# Patient Record
Sex: FEMALE | Race: BLACK OR AFRICAN AMERICAN | Employment: FULL TIME | URBAN - METROPOLITAN AREA
[De-identification: names, ages, dates, MRNs, and addresses within clinical notes are randomized per-mention and may not be internally consistent; named-entity substitution may affect disease eponyms.]

---

## 2019-09-04 ENCOUNTER — OFFICE VISIT (OUTPATIENT)
Dept: URGENT CARE | Age: 29
End: 2019-09-04

## 2019-09-04 VITALS
HEART RATE: 105 BPM | RESPIRATION RATE: 18 BRPM | SYSTOLIC BLOOD PRESSURE: 135 MMHG | OXYGEN SATURATION: 100 % | DIASTOLIC BLOOD PRESSURE: 74 MMHG | BODY MASS INDEX: 41.82 KG/M2 | WEIGHT: 213 LBS | TEMPERATURE: 98.6 F | HEIGHT: 60 IN

## 2019-09-04 DIAGNOSIS — M79.605 PAIN OF LEFT LOWER EXTREMITY: Primary | ICD-10-CM

## 2019-09-04 PROBLEM — E66.01 OBESITY, MORBID (HCC): Status: ACTIVE | Noted: 2019-09-04

## 2019-09-04 RX ORDER — OMEPRAZOLE 20 MG/1
20 TABLET, DELAYED RELEASE ORAL DAILY
COMMUNITY

## 2019-09-04 RX ORDER — NAPROXEN 500 MG/1
500 TABLET ORAL 2 TIMES DAILY WITH MEALS
Qty: 20 TAB | Refills: 0 | Status: SHIPPED | OUTPATIENT
Start: 2019-09-04

## 2019-09-04 RX ORDER — BACLOFEN 20 MG/1
20 TABLET ORAL 2 TIMES DAILY
Qty: 15 TAB | Refills: 0 | Status: SHIPPED | OUTPATIENT
Start: 2019-09-04

## 2019-09-04 NOTE — PATIENT INSTRUCTIONS
Calf Strain: Rehab Exercises  Introduction  Here are some examples of exercises for you to try. The exercises may be suggested for a condition or for rehabilitation. Start each exercise slowly. Ease off the exercises if you start to have pain. You will be told when to start these exercises and which ones will work best for you. How to do the exercises  Calf wall stretch (back knee straight)    1. Stand facing a wall with your hands on the wall at about eye level. Put your affected leg about a step behind your other leg. 2. Keeping your back leg straight and your back heel on the floor, bend your front knee and gently bring your hip and chest toward the wall until you feel a stretch in the calf of your back leg. 3. Hold the stretch for at least 15 to 30 seconds. 4. Repeat 2 to 4 times. Calf wall stretch (knees bent)    1. Stand facing a wall with your hands on the wall at about eye level. Put your affected leg about a step behind your other leg. 2. Keeping both heels on the floor, bend both knees. Then gently bring your hip and chest toward the wall until you feel a stretch in the calf of your back leg. 3. Hold the stretch for at least 15 to 30 seconds. 4. Repeat 2 to 4 times. Bilateral calf stretch (knees straight)    1. Place a book on the floor a few inches from a wall or countertop, and put the balls of your feet on it. Your heels should be on the floor. The book needs to be thick enough so that you can feel a gentle stretch in each calf. If you are not steady on your feet, hold on to a chair, counter, or wall while you do this stretch. 2. Keep your knees straight, and lean forward until you feel a stretch in each calf. 3. To get more stretch, add another book or use a thicker book, such as a phone book, a dictionary, or an encyclopedia. 4. Hold the stretch for at least 15 to 30 seconds. 5. Repeat 2 to 4 times. Bilateral calf stretch (knees bent)    1.  Place a book on the floor a few inches from a wall or countertop, and put the balls of your feet on it. Your heels should be on the floor. The book needs to be thick enough so that you can feel a gentle stretch in each calf. If you are not steady on your feet, hold on to a chair, counter, or wall while you do this stretch. 2. Bend your knees, and lean forward until you feel a stretch in each calf. 3. To get more stretch, add another book or use a thicker book, such as a phone book, a dictionary, or an encyclopedia. 4. Hold the stretch for at least 15 to 30 seconds. 5. Repeat 2 to 4 times. Ankle plantarflexion    1. Sit with your affected leg straight and supported on the floor. Your other leg should be bent, with that foot flat on the floor. 2. Keeping your affected leg straight, gently flex your foot downward so your toes are pointed away from your body. Then slowly relax your foot to the starting position. 3. Repeat 8 to 12 times. Ankle dorsiflexion    1. Sit with your affected leg straight and supported on the floor. Your other leg should be bent, with that foot flat on the floor. 2. Keeping your leg straight, gently flex your foot back so your toes point upward. Then slowly relax your foot to the starting position. 3. Repeat 8 to 12 times. Bilateral heel raises on step    1. Stand on the bottom step of a staircase, facing up toward the stairs. Put the balls of your feet on the step. If you are not steady on your feet, hold on to the banister or wall. 2. Keeping both knees straight, slowly lift your heels above the step so that you are standing on your toes. Then slowly lower your heels below the step and toward the floor. 3. Return to the starting position, with your feet even with the step. 4. Repeat 8 to 12 times. Follow-up care is a key part of your treatment and safety. Be sure to make and go to all appointments, and call your doctor if you are having problems.  It's also a good idea to know your test results and keep a list of the medicines you take. Where can you learn more? Go to http://yoli-irvin.info/. Enter X113 in the search box to learn more about \"Calf Strain: Rehab Exercises. \"  Current as of: September 20, 2018  Content Version: 12.1  © 8438-9196 [employer]. Care instructions adapted under license by [employer] (which disclaims liability or warranty for this information). If you have questions about a medical condition or this instruction, always ask your healthcare professional. [address] any warranty or liability for your use of this information.

## 2019-09-04 NOTE — PROGRESS NOTES
Leg Pain   This is a new problem. The current episode started more than 1 week ago. The problem occurs daily. The problem has not changed since onset. 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 The symptoms are aggravated by standing and walking. The symptoms are relieved by rest. She has tried acetaminophen for the symptoms. The treatment provided mild relief. Past Medical History:   Diagnosis Date    Acid reflux         History reviewed. No pertinent surgical history.       Family History   Problem Relation Age of Onset    No Known Problems Mother     No Known Problems Father         Social History     Socioeconomic History    Marital status: UNKNOWN     Spouse name: Not on file    Number of children: Not on file    Years of education: Not on file    Highest education level: Not on file   Occupational History    Not on file   Social Needs    Financial resource strain: Not on file    Food insecurity:     Worry: Not on file     Inability: Not on file    Transportation needs:     Medical: Not on file     Non-medical: Not on file   Tobacco Use    Smoking status: Never Smoker    Smokeless tobacco: Never Used   Substance and Sexual Activity    Alcohol use: Not on file    Drug use: Not on file    Sexual activity: Not on file   Lifestyle    Physical activity:     Days per week: Not on file     Minutes per session: Not on file    Stress: Not on file   Relationships    Social connections:     Talks on phone: Not on file     Gets together: Not on file     Attends Bahai service: Not on file     Active member of club or organization: Not on file     Attends meetings of clubs or organizations: Not on file     Relationship status: Not on file    Intimate partner violence:     Fear of current or ex partner: Not on file     Emotionally abused: Not on file     Physically abused: Not on file     Forced sexual activity: Not on file   Other Topics Concern    Not on file   Social History Narrative    Not on file                ALLERGIES: Patient has no known allergies. Review of Systems   All other systems reviewed and are negative. Vitals:    09/04/19 1606   BP: 135/74   Pulse: (!) 105   Resp: 18   Temp: 98.6 °F (37 °C)   SpO2: 100%   Weight: 213 lb (96.6 kg)   Height: 5' (1.524 m)       Physical Exam   Musculoskeletal:        Left hip: Normal.        Left knee: Normal.        Left ankle: Normal.        Left upper leg: Normal.        Left lower leg: Normal. She exhibits no tenderness, no swelling and no edema. Left foot: There is deformity (flat foot). There is normal range of motion, no tenderness and no swelling. Nursing note and vitals reviewed. MDM    Procedures        ICD-10-CM ICD-9-CM    1. Pain of left lower extremity M79.605 729.5     calf strain- ? poor posture/flat foot related      Self exercise  If pain worsen/ develop swelling go to Ed  Advised to [employer] foot in warm water- self exercise  Use arch support and proper fitting new shoes  Medications Ordered Today   Medications    baclofen (LIORESAL) 20 mg tablet     Sig: Take 1 Tab by mouth two (2) times a day. Dispense:  15 Tab     Refill:  0    naproxen (NAPROSYN) 500 mg tablet     Sig: Take 1 Tab by mouth two (2) times daily (with meals). Dispense:  20 Tab     Refill:  0     No results found for any visits on 09/04/19. The patients condition was discussed with the patient and they understand. The patient is to follow up with primary care doctor. If signs and symptoms become worse the pt is to go to the ER. The patient is to take medications as prescribed.

## 2020-11-13 ENCOUNTER — APPOINTMENT (OUTPATIENT)
Dept: CT IMAGING | Age: 30
End: 2020-11-13
Attending: NURSE PRACTITIONER
Payer: COMMERCIAL

## 2020-11-13 ENCOUNTER — APPOINTMENT (OUTPATIENT)
Dept: ULTRASOUND IMAGING | Age: 30
End: 2020-11-13
Attending: NURSE PRACTITIONER
Payer: COMMERCIAL

## 2020-11-13 ENCOUNTER — HOSPITAL ENCOUNTER (EMERGENCY)
Age: 30
Discharge: HOME | End: 2020-11-14
Attending: EMERGENCY MEDICINE | Admitting: EMERGENCY MEDICINE
Payer: COMMERCIAL

## 2020-11-13 DIAGNOSIS — R79.89 ELEVATED LFTS: ICD-10-CM

## 2020-11-13 DIAGNOSIS — R22.0 FACIAL SWELLING: ICD-10-CM

## 2020-11-13 DIAGNOSIS — R59.0 LYMPHADENOPATHY, SUBMANDIBULAR: Primary | ICD-10-CM

## 2020-11-13 LAB
ALBUMIN SERPL-MCNC: 3.2 G/DL (ref 3.5–5)
ALBUMIN/GLOB SERPL: 0.9 {RATIO} (ref 1.1–2.2)
ALP SERPL-CCNC: 718 U/L (ref 45–117)
ALT SERPL-CCNC: 236 U/L (ref 12–78)
ANION GAP SERPL CALC-SCNC: 9 MMOL/L (ref 5–15)
AST SERPL-CCNC: 142 U/L (ref 15–37)
BASOPHILS # BLD: 0 K/UL (ref 0–0.1)
BASOPHILS NFR BLD: 0 % (ref 0–1)
BILIRUB SERPL-MCNC: 1.5 MG/DL (ref 0.2–1)
BUN SERPL-MCNC: 15 MG/DL (ref 6–20)
BUN/CREAT SERPL: 12 (ref 12–20)
CALCIUM SERPL-MCNC: 9.3 MG/DL (ref 8.5–10.1)
CHLORIDE SERPL-SCNC: 102 MMOL/L (ref 97–108)
CO2 SERPL-SCNC: 27 MMOL/L (ref 21–32)
CREAT SERPL-MCNC: 1.25 MG/DL (ref 0.55–1.02)
DIFFERENTIAL METHOD BLD: ABNORMAL
EOSINOPHIL # BLD: 0 K/UL (ref 0–0.4)
EOSINOPHIL NFR BLD: 0 % (ref 0–7)
ERYTHROCYTE [DISTWIDTH] IN BLOOD BY AUTOMATED COUNT: 20.1 % (ref 11.5–14.5)
GLOBULIN SER CALC-MCNC: 3.6 G/DL (ref 2–4)
GLUCOSE SERPL-MCNC: 95 MG/DL (ref 65–100)
HCG UR QL: NEGATIVE
HCT VFR BLD AUTO: 37.4 % (ref 35–47)
HETEROPH AB BLD QL IA: NEGATIVE
HGB BLD-MCNC: 11.6 G/DL (ref 11.5–16)
IMM GRANULOCYTES # BLD AUTO: 0 K/UL
IMM GRANULOCYTES NFR BLD AUTO: 0 %
LIPASE SERPL-CCNC: 31 U/L (ref 73–393)
LYMPHOCYTES # BLD: 8 K/UL (ref 0.8–3.5)
LYMPHOCYTES NFR BLD: 51 % (ref 12–49)
MCH RBC QN AUTO: 22.8 PG (ref 26–34)
MCHC RBC AUTO-ENTMCNC: 31 G/DL (ref 30–36.5)
MCV RBC AUTO: 73.5 FL (ref 80–99)
MONOCYTES # BLD: 0.5 K/UL (ref 0–1)
MONOCYTES NFR BLD: 3 % (ref 5–13)
NEUTS SEG # BLD: 7.3 K/UL (ref 1.8–8)
NEUTS SEG NFR BLD: 46 % (ref 32–75)
NRBC # BLD: 0 K/UL (ref 0–0.01)
NRBC BLD-RTO: 0 PER 100 WBC
PLATELET # BLD AUTO: 560 K/UL (ref 150–400)
PMV BLD AUTO: 9.9 FL (ref 8.9–12.9)
POTASSIUM SERPL-SCNC: 3.3 MMOL/L (ref 3.5–5.1)
PROT SERPL-MCNC: 6.8 G/DL (ref 6.4–8.2)
RBC # BLD AUTO: 5.09 M/UL (ref 3.8–5.2)
RBC MORPH BLD: ABNORMAL
SODIUM SERPL-SCNC: 138 MMOL/L (ref 136–145)
WBC # BLD AUTO: 15.8 K/UL (ref 3.6–11)
WBC MORPH BLD: ABNORMAL

## 2020-11-13 PROCEDURE — 74011000636 HC RX REV CODE- 636: Performed by: RADIOLOGY

## 2020-11-13 PROCEDURE — 87040 BLOOD CULTURE FOR BACTERIA: CPT

## 2020-11-13 PROCEDURE — 70491 CT SOFT TISSUE NECK W/DYE: CPT

## 2020-11-13 PROCEDURE — 74011000258 HC RX REV CODE- 258: Performed by: NURSE PRACTITIONER

## 2020-11-13 PROCEDURE — 83690 ASSAY OF LIPASE: CPT

## 2020-11-13 PROCEDURE — 74011000258 HC RX REV CODE- 258: Performed by: RADIOLOGY

## 2020-11-13 PROCEDURE — 74011250636 HC RX REV CODE- 250/636: Performed by: NURSE PRACTITIONER

## 2020-11-13 PROCEDURE — 87254 VIRUS INOCULATION SHELL VIA: CPT

## 2020-11-13 PROCEDURE — 81025 URINE PREGNANCY TEST: CPT

## 2020-11-13 PROCEDURE — 80053 COMPREHEN METABOLIC PANEL: CPT

## 2020-11-13 PROCEDURE — 76705 ECHO EXAM OF ABDOMEN: CPT

## 2020-11-13 PROCEDURE — 86308 HETEROPHILE ANTIBODY SCREEN: CPT

## 2020-11-13 PROCEDURE — 85025 COMPLETE CBC W/AUTO DIFF WBC: CPT

## 2020-11-13 PROCEDURE — 96375 TX/PRO/DX INJ NEW DRUG ADDON: CPT

## 2020-11-13 PROCEDURE — 99284 EMERGENCY DEPT VISIT MOD MDM: CPT

## 2020-11-13 PROCEDURE — 36415 COLL VENOUS BLD VENIPUNCTURE: CPT

## 2020-11-13 PROCEDURE — 96366 THER/PROPH/DIAG IV INF ADDON: CPT

## 2020-11-13 PROCEDURE — 96365 THER/PROPH/DIAG IV INF INIT: CPT

## 2020-11-13 RX ORDER — DEXAMETHASONE SODIUM PHOSPHATE 10 MG/ML
10 INJECTION INTRAMUSCULAR; INTRAVENOUS ONCE
Status: COMPLETED | OUTPATIENT
Start: 2020-11-13 | End: 2020-11-13

## 2020-11-13 RX ORDER — SODIUM CHLORIDE 0.9 % (FLUSH) 0.9 %
10 SYRINGE (ML) INJECTION
Status: COMPLETED | OUTPATIENT
Start: 2020-11-13 | End: 2020-11-13

## 2020-11-13 RX ORDER — KETOROLAC TROMETHAMINE 30 MG/ML
30 INJECTION, SOLUTION INTRAMUSCULAR; INTRAVENOUS
Status: COMPLETED | OUTPATIENT
Start: 2020-11-13 | End: 2020-11-13

## 2020-11-13 RX ORDER — DIPHENHYDRAMINE HYDROCHLORIDE 50 MG/ML
25 INJECTION, SOLUTION INTRAMUSCULAR; INTRAVENOUS
Status: COMPLETED | OUTPATIENT
Start: 2020-11-13 | End: 2020-11-14

## 2020-11-13 RX ADMIN — SODIUM CHLORIDE 1000 ML: 900 INJECTION, SOLUTION INTRAVENOUS at 20:22

## 2020-11-13 RX ADMIN — SODIUM CHLORIDE 50 ML: 900 INJECTION, SOLUTION INTRAVENOUS at 22:00

## 2020-11-13 RX ADMIN — SODIUM CHLORIDE 1000 ML: 900 INJECTION, SOLUTION INTRAVENOUS at 21:26

## 2020-11-13 RX ADMIN — KETOROLAC TROMETHAMINE 30 MG: 30 INJECTION, SOLUTION INTRAMUSCULAR at 20:22

## 2020-11-13 RX ADMIN — IOPAMIDOL 100 ML: 612 INJECTION, SOLUTION INTRAVENOUS at 20:01

## 2020-11-13 RX ADMIN — Medication 10 ML: at 22:00

## 2020-11-13 RX ADMIN — AMPICILLIN SODIUM AND SULBACTAM SODIUM 3 G: 2; 1 INJECTION, POWDER, FOR SOLUTION INTRAMUSCULAR; INTRAVENOUS at 21:26

## 2020-11-13 RX ADMIN — DEXAMETHASONE SODIUM PHOSPHATE 10 MG: 10 INJECTION, SOLUTION INTRAMUSCULAR; INTRAVENOUS at 20:22

## 2020-11-13 NOTE — LETTER
[address] 
[address] Work/School Note Date: 11/13/2020 To Whom It May concern: 
 
Prabhu Kimball was seen and treated today in the emergency room by the following provider(s): 
Attending Provider: Abril Trinidad MD 
Nurse Practitioner: Celestino Morales NP. Prabhu Kimball may return to work on 11/18/20. Sincerely, Sasha Pineda NP

## 2020-11-13 NOTE — ED TRIAGE NOTES
Pt reports R sided jaw pain and swelling x4 days. Pt has seen a dentist, oral surgeon, and orthodontist today and they referred her here.

## 2020-11-14 VITALS
OXYGEN SATURATION: 98 % | BODY MASS INDEX: 36.29 KG/M2 | DIASTOLIC BLOOD PRESSURE: 88 MMHG | TEMPERATURE: 97.9 F | HEIGHT: 59 IN | SYSTOLIC BLOOD PRESSURE: 133 MMHG | RESPIRATION RATE: 16 BRPM | WEIGHT: 180 LBS | HEART RATE: 89 BPM

## 2020-11-14 PROCEDURE — 96375 TX/PRO/DX INJ NEW DRUG ADDON: CPT

## 2020-11-14 PROCEDURE — 96366 THER/PROPH/DIAG IV INF ADDON: CPT

## 2020-11-14 PROCEDURE — 74011000250 HC RX REV CODE- 250: Performed by: NURSE PRACTITIONER

## 2020-11-14 PROCEDURE — 74011250636 HC RX REV CODE- 250/636: Performed by: NURSE PRACTITIONER

## 2020-11-14 PROCEDURE — 2709999900 HC NON-CHARGEABLE SUPPLY

## 2020-11-14 RX ORDER — PREDNISONE 20 MG/1
20 TABLET ORAL DAILY
Qty: 5 TAB | Refills: 0 | Status: SHIPPED | OUTPATIENT
Start: 2020-11-15 | End: 2020-11-20

## 2020-11-14 RX ORDER — CLINDAMYCIN HYDROCHLORIDE 150 MG/1
450 CAPSULE ORAL 3 TIMES DAILY
Qty: 90 CAP | Refills: 0 | Status: SHIPPED | OUTPATIENT
Start: 2020-11-14 | End: 2020-11-24

## 2020-11-14 RX ADMIN — DIPHENHYDRAMINE HYDROCHLORIDE 25 MG: 50 INJECTION, SOLUTION INTRAMUSCULAR; INTRAVENOUS at 00:04

## 2020-11-14 RX ADMIN — FAMOTIDINE 20 MG: 10 INJECTION INTRAVENOUS at 00:04

## 2020-11-14 NOTE — DISCHARGE INSTRUCTIONS
There is no clear-cut cause as to why you have so much swelling, but we are testing you for the mumps virus. Your CT scan does show evidence of swollen lymph nodes and soft tissue swelling, which could be due to inflammatory condition or infectious condition. For for this reason, please take prednisone and clindamycin exactly as prescribed. You may also take medications like ibuprofen to help with discomfort and swelling. Mumps is extremely contagious, and it is very important that you completely isolate yourself until your test results come back. If negative, you can resume your normal activities. If it is positive, you must have a follow-up appointment with primary care doctor to screen you for continued isolation. Please make an appointment to establish care with a primary care physician as soon as possible for re-evaluation. You may have also had an allergic reaction to either the antibiotic or the IV contrast during your visit. Your symptoms improved significantly after Benadryl. Please take Benadryl (25 mg) as needed for itching.

## 2020-11-14 NOTE — ED NOTES
NAD, VSS, rx electronically sent, baseline mentation, no questions r/t dci, work note given, information r/t PCP's given

## 2020-11-14 NOTE — ED PROVIDER NOTES
This is a 82-year-old female with past medical history of acid reflux and obesity who presents the ER today for evaluation of facial swelling. According to patient, she developed unprovoked facial swelling starting on Tuesday of this week which is continued to progress in nature since she first noticed it. She reports going to several different physicians specialties, including dentist, oral surgeon, and primary care, who did not find any abnormalities in her dentition or any findings concerning for an infectious process. One of the doctors believe that she had TMJ due to jaw tenderness and trismus, and prescribed ibuprofen and Robaxin (which she has not taken). At this time, she reports minimal discomfort, but reports severe limitation with opening her mouth due to the degree of swelling. She also reports a sensation of dyspnea when she lays in a supine positioning \"because my neck is so swollen\". She denies any swelling of her lips. She also denies any tooth pain, fever/chills, rash, or skin lesions. No headache, nausea, vomiting, or visual disturbances. Has not had any changes in the characteristics of her voice. Patient states that all of her immunizations are up-to-date.              Past Medical History:   Diagnosis Date    Acid reflux        Past Surgical History:   Procedure Laterality Date    HX TONSILLECTOMY      HX WISDOM TEETH EXTRACTION           Family History:   Problem Relation Age of Onset    No Known Problems Mother     No Known Problems Father        Social History     Socioeconomic History    Marital status: UNKNOWN     Spouse name: Not on file    Number of children: Not on file    Years of education: Not on file    Highest education level: Not on file   Occupational History    Not on file   Social Needs    Financial resource strain: Not on file    Food insecurity     Worry: Not on file     Inability: Not on file    Transportation needs     Medical: Not on file Non-medical: Not on file   Tobacco Use    Smoking status: Never Smoker    Smokeless tobacco: Never Used   Substance and Sexual Activity    Alcohol use: Yes     Comment: socially    Drug use: Not Currently    Sexual activity: Not on file   Lifestyle    Physical activity     Days per week: Not on file     Minutes per session: Not on file    Stress: Not on file   Relationships    Social connections     Talks on phone: Not on file     Gets together: Not on file     Attends Church service: Not on file     Active member of club or organization: Not on file     Attends meetings of clubs or organizations: Not on file     Relationship status: Not on file    Intimate partner violence     Fear of current or ex partner: Not on file     Emotionally abused: Not on file     Physically abused: Not on file     Forced sexual activity: Not on file   Other Topics Concern    Not on file   Social History Narrative    Not on file         ALLERGIES: Patient has no known allergies. Review of Systems   Constitutional: Negative for fever. HENT: Positive for facial swelling. Negative for dental problem, drooling and sore throat. Eyes: Negative for visual disturbance. Respiratory: Negative for cough, shortness of breath, wheezing and stridor. Cardiovascular: Negative for palpitations. Gastrointestinal: Negative for vomiting. Genitourinary: Negative for dysuria. Musculoskeletal: Negative for myalgias. Skin: Negative for rash. Neurological: Negative for dizziness. Psychiatric/Behavioral: Negative for dysphoric mood. Vitals:    11/13/20 1851 11/14/20 0121   BP: (!) 144/87 133/88   Pulse: (!) 108 89   Resp: 18 16   Temp: 98.6 °F (37 °C) 97.9 °F (36.6 °C)   SpO2: 100% 98%   Weight: 81.6 kg (180 lb)    Height: 4' 11\" (1.499 m)             Physical Exam  Vitals signs and nursing note reviewed. Constitutional:       General: She is not in acute distress. Appearance: Normal appearance.  She is not ill-appearing. HENT:      Head: Normocephalic and atraumatic. Jaw: There is normal jaw occlusion. Trismus, swelling and pain on movement present. Salivary Glands: Right salivary gland is diffusely enlarged. Left salivary gland is diffusely enlarged. Comments: Significant swelling of buccal mucosa with no swelling of the lips     Right Ear: External ear normal.      Left Ear: External ear normal.      Nose: Nose normal.      Mouth/Throat:      Mouth: Mucous membranes are moist.      Pharynx: Oropharynx is clear. Eyes:      General: No scleral icterus. Extraocular Movements: Extraocular movements intact. Conjunctiva/sclera: Conjunctivae normal.      Pupils: Pupils are equal, round, and reactive to light. Neck:      Musculoskeletal: Normal range of motion and neck supple. Edema present. No injury, pain with movement or muscular tenderness. Trachea: Phonation normal.      Comments: Swelling of buccal mucosa. Swelling of submandibular soft tissues. Cardiovascular:      Rate and Rhythm: Normal rate and regular rhythm. Pulses: Normal pulses. Heart sounds: Normal heart sounds. Pulmonary:      Effort: Pulmonary effort is normal.      Breath sounds: Normal breath sounds. Abdominal:      General: Abdomen is flat. Bowel sounds are normal.      Palpations: Abdomen is soft. Musculoskeletal: Normal range of motion. Lymphadenopathy:      Cervical: No cervical adenopathy. Skin:     General: Skin is warm and dry. Coloration: Skin is not pale. Neurological:      General: No focal deficit present. Mental Status: She is alert and oriented to person, place, and time. Psychiatric:         Mood and Affect: Mood normal.         Behavior: Behavior normal.         Thought Content:  Thought content normal.         Judgment: Judgment normal.          MDM      VITAL SIGNS:  Patient Vitals for the past 4 hrs:   Temp Pulse Resp BP SpO2   11/13/20 1851 98.6 °F (37 °C) (!) 108 18 (!) 144/87 100 %         LABS:  Recent Results (from the past 6 hour(s))   CBC WITH AUTOMATED DIFF    Collection Time: 11/13/20  7:49 PM   Result Value Ref Range    WBC 15.8 (H) 3.6 - 11.0 K/uL    RBC 5.09 3.80 - 5.20 M/uL    HGB 11.6 11.5 - 16.0 g/dL    HCT 37.4 35.0 - 47.0 %    MCV 73.5 (L) 80.0 - 99.0 FL    MCH 22.8 (L) 26.0 - 34.0 PG    MCHC 31.0 30.0 - 36.5 g/dL    RDW 20.1 (H) 11.5 - 14.5 %    PLATELET 008 (H) 939 - 400 K/uL    MPV 9.9 8.9 - 12.9 FL    NRBC 0.0 0  WBC    ABSOLUTE NRBC 0.00 0.00 - 0.01 K/uL    NEUTROPHILS 46 32 - 75 %    LYMPHOCYTES 51 (H) 12 - 49 %    MONOCYTES 3 (L) 5 - 13 %    EOSINOPHILS 0 0 - 7 %    BASOPHILS 0 0 - 1 %    IMMATURE GRANULOCYTES 0 %    ABS. NEUTROPHILS 7.3 1.8 - 8.0 K/UL    ABS. LYMPHOCYTES 8.0 (H) 0.8 - 3.5 K/UL    ABS. MONOCYTES 0.5 0.0 - 1.0 K/UL    ABS. EOSINOPHILS 0.0 0.0 - 0.4 K/UL    ABS. BASOPHILS 0.0 0.0 - 0.1 K/UL    ABS. IMM. GRANS. 0.0 K/UL    DF SMEAR SCANNED      RBC COMMENTS ANISOCYTOSIS  2+        WBC COMMENTS ATYPICAL LYMPHOCYTES PRESENT     METABOLIC PANEL, COMPREHENSIVE    Collection Time: 11/13/20  7:49 PM   Result Value Ref Range    Sodium 138 136 - 145 mmol/L    Potassium 3.3 (L) 3.5 - 5.1 mmol/L    Chloride 102 97 - 108 mmol/L    CO2 27 21 - 32 mmol/L    Anion gap 9 5 - 15 mmol/L    Glucose 95 65 - 100 mg/dL    BUN 15 6 - 20 MG/DL    Creatinine 1.25 (H) 0.55 - 1.02 MG/DL    BUN/Creatinine ratio 12 12 - 20      GFR est AA >60 >60 ml/min/1.73m2    GFR est non-AA 51 (L) >60 ml/min/1.73m2    Calcium 9.3 8.5 - 10.1 MG/DL    Bilirubin, total 1.5 (H) 0.2 - 1.0 MG/DL    ALT (SGPT) 236 (H) 12 - 78 U/L    AST (SGOT) 142 (H) 15 - 37 U/L    Alk.  phosphatase 718 (H) 45 - 117 U/L    Protein, total 6.8 6.4 - 8.2 g/dL    Albumin 3.2 (L) 3.5 - 5.0 g/dL    Globulin 3.6 2.0 - 4.0 g/dL    A-G Ratio 0.9 (L) 1.1 - 2.2     MONONUCLEOSIS SCREEN    Collection Time: 11/13/20  7:49 PM   Result Value Ref Range    Mononucleosis screen Negative NEG     LIPASE    Collection Time: 11/13/20  7:49 PM   Result Value Ref Range    Lipase 31 (L) 73 - 393 U/L        IMAGING:  US ABD LTD   Final Result   IMPRESSION:    Cholelithiasis. Otherwise unremarkable exam               CT NECK SOFT TISSUE W CONT   Final Result   IMPRESSION:   Diffuse mild inflammatory change with scattered enlarged cervical lymph nodes   diffuse. Consider nonspecific infectious/inflammatory process. There is no abscess or drainable fluid collection. There is no sialolithiasis or   mass lesion. Medications During Visit:  Medications   diphenhydrAMINE (BENADRYL) injection 25 mg (has no administration in time range)   famotidine (PF) (PEPCID) 20 mg in 0.9% sodium chloride 10 mL injection (has no administration in time range)   dexamethasone (PF) (DECADRON) 10 mg/mL injection 10 mg (10 mg IntraVENous Given 11/13/20 2022)   sodium chloride 0.9 % bolus infusion 1,000 mL (1,000 mL IntraVENous New Bag 11/13/20 2022)   ketorolac (TORADOL) injection 30 mg (30 mg IntraVENous Given 11/13/20 2022)   sodium chloride 0.9 % bolus infusion 100 mL (50 mL IntraVENous New Bag 11/13/20 2200)   sodium chloride (NS) flush 10 mL (10 mL IntraVENous Given 11/13/20 2200)   iopamidoL (ISOVUE 300) 61 % contrast injection 100 mL (100 mL IntraVENous Given 11/13/20 2001)   ampicillin-sulbactam (UNASYN) 3 g in 0.9% sodium chloride (MBP/ADV) 100 mL MBP (3 g IntraVENous New Bag 11/13/20 2126)   sodium chloride 0.9 % bolus infusion 1,000 mL (1,000 mL IntraVENous New Bag 11/13/20 2126)         DECISION MAKING:  Bolivar Delacruz is a 31 y.o. female who comes in as above. Patient reports significant provement in trismus and sensation of oropharyngeal swelling after receiving Toradol and Decadron. Markedly elevated LFTs present with an absence of abdominal complaints. She does report having an isolated episode of vomiting and 2 days of diarrhea, which resolved spontaneously on its own approximately 4 days ago.   Ultrasound of the abdomen reveals cholelithiasis without any other acute abnormalities. Monospot negative. CBC does show changes of marked leukocytosis with atypical lymphocytes on the smear, which could be due to an infectious or inflammatory etiology. CT scan of the neck and soft tissues reveals nonspecific diffuse inflammatory changes with enlarged cervical lymph nodes. No drainable abscess observed. While the patient reports receiving all of her normal immunizations, symptoms do have some concerning features for mumps virus. Mumps swab collected at this time. 11:53 PM  Attempted to discharge patient this time, but reevaluation revealed that she developed pruritic hives on her hand/wrists and thighs that could be due to an allergic reaction to either the Unasyn or IV contrast.  Both agents were added as a potential allergy, Benadryl and Pepcid administered and will reevaluate. 1:00 AM  Hives and itching have completely resolved after the patient received Benadryl and Pepcid. Lung sounds remain clear. Plan:  Treat possible infectious or inflammatory condition with clindamycin and 5 days of prednisone. Mumps test pending: Patient instructed to strictly isolate herself until the test results come back. If positive, she will need follow-up clearing her for work. If negative, she can resume her normal activities upon obtaining the results. Benadryl as needed if itching recurs. Strict return precautions discussed for increased swelling, worsening symptoms, or dyspnea. Above results discussed and reviewed with the patient. Patient verbalized understanding of the care plan, including any changes to current outpatient medication regimen, discussed disease process, symptom control, and follow-up care. IMPRESSION:  1. Lymphadenopathy, submandibular    2. Facial swelling    3.  Elevated LFTs        DISPOSITION:  Discharged      Discharge Medication List as of 11/14/2020  1:09 AM      START taking these medications    Details   predniSONE (DELTASONE) 20 mg tablet Take 20 mg by mouth daily for 5 days. With Breakfast, Normal, Disp-5 Tab,R-0      clindamycin (CLEOCIN) 150 mg capsule Take 3 Caps by mouth three (3) times daily for 10 days. , Normal, Disp-90 Cap,R-0         CONTINUE these medications which have NOT CHANGED    Details   omeprazole (PRILOSEC OTC) 20 mg tablet Take 20 mg by mouth daily. , Historical Med      baclofen (LIORESAL) 20 mg tablet Take 1 Tab by mouth two (2) times a day., Normal, Disp-15 Tab, R-0      naproxen (NAPROSYN) 500 mg tablet Take 1 Tab by mouth two (2) times daily (with meals). , Normal, Disp-20 Tab, R-0              Follow-up Information     Follow up With Specialties Details Why Contact Evy Lyons  Schedule an appointment as soon as possible for a visit  [address]    [address], [address] [facility] [address] [facility]  If symptoms worsen [address] [facility]  [phone]            The patient is asked to follow-up with their primary care provider in the next several days. They are to call tomorrow for an appointment. The patient is asked to return promptly for any increased concerns or worsening of symptoms. They can return to this emergency department or any other emergency department.

## 2020-11-18 LAB
BACTERIA SPEC CULT: NORMAL
SERVICE CMNT-IMP: NORMAL

## 2020-11-26 LAB
Lab: NORMAL
REFERENCE LAB,REFLB: NORMAL
TEST DESCRIPTION:,ATST: NORMAL

## 2022-03-20 PROBLEM — E66.01 OBESITY, MORBID (HCC): Status: ACTIVE | Noted: 2019-09-04

## 2023-05-26 RX ORDER — OMEPRAZOLE 20 MG/1
20 TABLET, DELAYED RELEASE ORAL DAILY
COMMUNITY

## 2023-05-26 RX ORDER — BACLOFEN 20 MG/1
20 TABLET ORAL 2 TIMES DAILY
COMMUNITY
Start: 2019-09-04

## 2023-05-26 RX ORDER — NAPROXEN 500 MG/1
500 TABLET ORAL 2 TIMES DAILY WITH MEALS
COMMUNITY
Start: 2019-09-04